# Patient Record
Sex: FEMALE | Race: WHITE | NOT HISPANIC OR LATINO | URBAN - METROPOLITAN AREA
[De-identification: names, ages, dates, MRNs, and addresses within clinical notes are randomized per-mention and may not be internally consistent; named-entity substitution may affect disease eponyms.]

---

## 2017-01-01 ENCOUNTER — HOME CARE/HOSPICE - HEALTHEAST (OUTPATIENT)
Dept: HOSPICE | Facility: HOSPICE | Age: 82
End: 2017-01-01

## 2017-01-01 ENCOUNTER — COMMUNICATION - HEALTHEAST (OUTPATIENT)
Dept: INTERNAL MEDICINE | Facility: CLINIC | Age: 82
End: 2017-01-01

## 2017-01-01 ENCOUNTER — AMBULATORY - HEALTHEAST (OUTPATIENT)
Dept: HOSPICE | Facility: HOSPICE | Age: 82
End: 2017-01-01

## 2017-01-01 ENCOUNTER — OFFICE VISIT - HEALTHEAST (OUTPATIENT)
Dept: GERIATRICS | Facility: CLINIC | Age: 82
End: 2017-01-01

## 2017-01-01 ENCOUNTER — OFFICE VISIT - HEALTHEAST (OUTPATIENT)
Dept: INTERNAL MEDICINE | Facility: CLINIC | Age: 82
End: 2017-01-01

## 2017-01-01 ENCOUNTER — AMBULATORY - HEALTHEAST (OUTPATIENT)
Dept: GERIATRICS | Facility: CLINIC | Age: 82
End: 2017-01-01

## 2017-01-01 ENCOUNTER — RECORDS - HEALTHEAST (OUTPATIENT)
Dept: GENERAL RADIOLOGY | Facility: CLINIC | Age: 82
End: 2017-01-01

## 2017-01-01 ENCOUNTER — AMBULATORY - HEALTHEAST (OUTPATIENT)
Dept: NURSING | Facility: CLINIC | Age: 82
End: 2017-01-01

## 2017-01-01 ENCOUNTER — COMMUNICATION - HEALTHEAST (OUTPATIENT)
Dept: GERIATRICS | Facility: CLINIC | Age: 82
End: 2017-01-01

## 2017-01-01 ENCOUNTER — HOME CARE/HOSPICE - HEALTHEAST (OUTPATIENT)
Dept: HOME HEALTH SERVICES | Facility: HOME HEALTH | Age: 82
End: 2017-01-01

## 2017-01-01 DIAGNOSIS — D64.89 ANEMIA DUE TO OTHER CAUSE: ICD-10-CM

## 2017-01-01 DIAGNOSIS — R06.00 DYSPNEA, UNSPECIFIED: ICD-10-CM

## 2017-01-01 DIAGNOSIS — R23.3 PETECHIAL RASH: ICD-10-CM

## 2017-01-01 DIAGNOSIS — I50.9 CONGESTIVE HEART FAILURE, UNSPECIFIED CONGESTIVE HEART FAILURE CHRONICITY, UNSPECIFIED CONGESTIVE HEART FAILURE TYPE: ICD-10-CM

## 2017-01-01 DIAGNOSIS — E03.9 HYPOTHYROIDISM: ICD-10-CM

## 2017-01-01 DIAGNOSIS — I48.20 CHRONIC ATRIAL FIBRILLATION (H): ICD-10-CM

## 2017-01-01 DIAGNOSIS — R06.00 DYSPNEA: ICD-10-CM

## 2017-01-01 DIAGNOSIS — M48.50XA VERTEBRAL COMPRESSION FRACTURE (H): ICD-10-CM

## 2017-01-01 DIAGNOSIS — M54.50 LOW BACK PAIN: ICD-10-CM

## 2017-01-01 DIAGNOSIS — I50.9 CHF (CONGESTIVE HEART FAILURE) (H): ICD-10-CM

## 2017-01-01 DIAGNOSIS — I10 ESSENTIAL HYPERTENSION: ICD-10-CM

## 2017-01-01 DIAGNOSIS — I10 ESSENTIAL HYPERTENSION WITH GOAL BLOOD PRESSURE LESS THAN 140/90: ICD-10-CM

## 2017-01-01 DIAGNOSIS — D72.828 OTHER ELEVATED WHITE BLOOD CELL (WBC) COUNT: ICD-10-CM

## 2017-01-01 DIAGNOSIS — E03.9 UNSPECIFIED HYPOTHYROIDISM: ICD-10-CM

## 2017-01-01 DIAGNOSIS — S32.000A LUMBAR COMPRESSION FRACTURE (H): ICD-10-CM

## 2017-01-01 DIAGNOSIS — M54.50 LUMBAGO: ICD-10-CM

## 2017-01-01 DIAGNOSIS — I10 UNSPECIFIED ESSENTIAL HYPERTENSION: ICD-10-CM

## 2017-01-01 RX ORDER — HALOPERIDOL 0.5 MG/1
0.5 TABLET ORAL EVERY 4 HOURS PRN
Status: SHIPPED | COMMUNITY
Start: 2017-01-01

## 2017-01-01 RX ORDER — TRAMADOL HYDROCHLORIDE 50 MG/1
25 TABLET ORAL 3 TIMES DAILY
Status: SHIPPED | COMMUNITY
Start: 2017-01-01

## 2017-01-01 RX ORDER — ACETAMINOPHEN 500 MG
500 TABLET ORAL EVERY 4 HOURS PRN
Status: SHIPPED | COMMUNITY
Start: 2017-01-01

## 2017-01-01 ASSESSMENT — MIFFLIN-ST. JEOR
SCORE: 727.88
SCORE: 723.34
SCORE: 714.27
SCORE: 764.71

## 2017-06-14 ENCOUNTER — COMMUNICATION - HEALTHEAST (OUTPATIENT)
Dept: GERIATRICS | Facility: CLINIC | Age: 82
End: 2017-06-14

## 2017-06-14 ENCOUNTER — AMBULATORY - HEALTHEAST (OUTPATIENT)
Dept: GERIATRICS | Facility: CLINIC | Age: 82
End: 2017-06-14

## 2017-06-14 ENCOUNTER — COMMUNICATION - HEALTHEAST (OUTPATIENT)
Dept: INTERNAL MEDICINE | Facility: CLINIC | Age: 82
End: 2017-06-14

## 2021-05-25 ENCOUNTER — RECORDS - HEALTHEAST (OUTPATIENT)
Dept: ADMINISTRATIVE | Facility: CLINIC | Age: 86
End: 2021-05-25

## 2021-05-26 ENCOUNTER — RECORDS - HEALTHEAST (OUTPATIENT)
Dept: ADMINISTRATIVE | Facility: CLINIC | Age: 86
End: 2021-05-26

## 2021-05-27 ENCOUNTER — RECORDS - HEALTHEAST (OUTPATIENT)
Dept: ADMINISTRATIVE | Facility: CLINIC | Age: 86
End: 2021-05-27

## 2021-05-28 ENCOUNTER — RECORDS - HEALTHEAST (OUTPATIENT)
Dept: ADMINISTRATIVE | Facility: CLINIC | Age: 86
End: 2021-05-28

## 2021-05-29 ENCOUNTER — RECORDS - HEALTHEAST (OUTPATIENT)
Dept: ADMINISTRATIVE | Facility: CLINIC | Age: 86
End: 2021-05-29

## 2021-05-30 ENCOUNTER — RECORDS - HEALTHEAST (OUTPATIENT)
Dept: ADMINISTRATIVE | Facility: CLINIC | Age: 86
End: 2021-05-30

## 2021-05-30 VITALS — HEIGHT: 62 IN | WEIGHT: 84 LBS | BODY MASS INDEX: 15.46 KG/M2

## 2021-05-30 VITALS — BODY MASS INDEX: 16.01 KG/M2 | HEIGHT: 62 IN | WEIGHT: 87 LBS

## 2021-05-30 VITALS — BODY MASS INDEX: 14.14 KG/M2 | WEIGHT: 79.8 LBS

## 2021-05-30 VITALS — WEIGHT: 74 LBS | BODY MASS INDEX: 13.11 KG/M2

## 2021-05-30 VITALS — BODY MASS INDEX: 17.51 KG/M2 | HEIGHT: 62 IN | WEIGHT: 95.12 LBS

## 2021-05-30 VITALS — BODY MASS INDEX: 15.83 KG/M2 | HEIGHT: 62 IN | WEIGHT: 86 LBS

## 2021-05-31 ENCOUNTER — RECORDS - HEALTHEAST (OUTPATIENT)
Dept: ADMINISTRATIVE | Facility: CLINIC | Age: 86
End: 2021-05-31

## 2021-06-02 ENCOUNTER — RECORDS - HEALTHEAST (OUTPATIENT)
Dept: ADMINISTRATIVE | Facility: CLINIC | Age: 86
End: 2021-06-02

## 2021-06-08 NOTE — PROGRESS NOTES
Shyla De La Rosa presents for her B12 injection. immunization given without incident. See MAR  history for documentation details.     Socorro Martínez CSS

## 2021-06-09 NOTE — PROGRESS NOTES
"  Office Visit - Follow Up   Shyla De La Rosa   89 y.o. female    Date of Visit: 3/10/2017    Chief Complaint   Patient presents with     Rash        Assessment and Plan   1. Petechial rash  I suspect this is bruising from where she crosses her legs.  Will check her blood counts make sure her platelets and INR are okay.  She has had a mild leukocytosis and macrocytic anemia, possibly myelodysplastic syndrome.  She should follow-up with Dr. Sukhjinder Miller next week  - HM1(CBC and Differential)  - INR  - HM1 (CBC with Diff)    2. Anemia due to other cause  3. Other elevated white blood cell (WBC) count    Return in about 1 week (around 3/17/2017) for follow up with PCP.     History of Present Illness   This 89 y.o. old woman comes in with her niece for evaluation of a rash on her upper legs.  She notes this past couple of days.  It is red.  Not bothersome at all no pain no itching.  Otherwise feeling okay.  Fever chills no nausea vomiting diarrhea.  No chest pain no night sweats no weight changes.    Review of Systems: A comprehensive review of systems was negative except as noted.     Medications, Allergies and Problem List   Reviewed and updated     Physical Exam   General Appearance:   No acute distress    Visit Vitals     /66 (Patient Site: Left Arm, Patient Position: Sitting, Cuff Size: Adult Regular)     Pulse 75     Ht 5' 2\" (1.575 m)     Wt (!) 84 lb (38.1 kg)     SpO2 97%     BMI 15.36 kg/m2       Cardiovascular regular rate and rhythm no murmur gallop or rub lungs clear to auscultation bilaterally abdomen is thin, nontender nondistended no organomegaly she has no lymphadenopathy she has a petechial, nonpalpable bruising in her legs, inner thighs upper      Additional Information   Current Outpatient Prescriptions   Medication Sig Dispense Refill     acetaminophen (TYLENOL) 500 MG tablet Take 500-1,000 mg by mouth every 6 (six) hours as needed for pain.       amLODIPine (NORVASC) 2.5 MG tablet TAKE " ONE TABLET BY MOUTH ONE TIME DAILY WITH LUNCH 90 tablet 3     calcium-vitamin D (CALCIUM-VITAMIN D) 500 mg(1,250mg) -200 unit per tablet Take 1 tablet by mouth 2 (two) times a day with meals.       cholecalciferol, vitamin D3, (VITAMIN D3) 1,000 unit capsule Take 1,000 Units by mouth daily.       cyanocobalamin 1,000 mcg/mL injection Inject 1,000 mcg into the shoulder, thigh, or buttocks every 30 (thirty) days.       digoxin (LANOXIN) 250 mcg tablet TAKE ONE TABLET BY MOUTH NIGHTLY AT BEDTIME 90 tablet 2     ferrous gluconate (FERGON) 240 mg (27 mg iron) tablet Take 240 mg by mouth daily with lunch.        gabapentin (NEURONTIN) 100 MG capsule TAKE 1 CAPSULE BY MOUTH ONCE DAILY 30 capsule 2     levothyroxine (SYNTHROID, LEVOTHROID) 50 MCG tablet TAKE ONE TABLET BY MOUTH ONE TIME DAILY AT 6AM 90 tablet 3     WALKER MISC Use As Directed.       [DISCONTINUED] omeprazole (PRILOSEC) 20 MG capsule Take 20 mg by mouth daily.       Current Facility-Administered Medications   Medication Dose Route Frequency Provider Last Rate Last Dose     cyanocobalamin injection 1,000 mcg  1,000 mcg Intramuscular Q30 Days Sukjhinder Meléndez MD   1,000 mcg at 02/09/17 0947     No Known Allergies  Social History   Substance Use Topics     Smoking status: Former Smoker     Years: 52.00     Types: Cigarettes     Quit date: 1/1/1994     Smokeless tobacco: Never Used     Alcohol use No      Comment: Occasional glass of wine       Review and/or order of clinical lab tests:  Review and/or order of radiology tests:  Review and/or order of medicine tests:  Discussion of test results with performing physician:  Decision to obtain old records and/or obtain history from someone other than the patient:  Review and summarization of old records and/or obtaining history from someone other than the patient and.or discussion of case with another health care provider:  Independent visualization of image, tracing or specimen itself:    Time:      Levi  Artis Mathews MD

## 2021-06-09 NOTE — PROGRESS NOTES
"OFFICE VISIT NOTE    Subjective:   Chief Complaint:  Cough (X 5 days, nonproductive, hard to cough, SOB at night. gained 11 pds since 3/10/17)    89-year-old woman with multiple problems including hypertension and atrial fibrillation.  She is in with for 5 days of increasing cough and some mild dyspnea and orthopnea.  Some weight gain.  No fevers or chills.  No nausea or vomiting.  No dysuria.    Current Outpatient Prescriptions   Medication Sig     acetaminophen (TYLENOL) 500 MG tablet Take 500-1,000 mg by mouth every 6 (six) hours as needed for pain.     amLODIPine (NORVASC) 2.5 MG tablet TAKE ONE TABLET BY MOUTH ONE TIME DAILY WITH LUNCH     calcium-vitamin D (CALCIUM-VITAMIN D) 500 mg(1,250mg) -200 unit per tablet Take 1 tablet by mouth 2 (two) times a day with meals.     cholecalciferol, vitamin D3, (VITAMIN D3) 1,000 unit capsule Take 1,000 Units by mouth daily.     cyanocobalamin 1,000 mcg/mL injection Inject 1,000 mcg into the shoulder, thigh, or buttocks every 30 (thirty) days.     digoxin (LANOXIN) 250 mcg tablet TAKE ONE TABLET BY MOUTH NIGHTLY AT BEDTIME     ferrous gluconate (FERGON) 240 mg (27 mg iron) tablet Take 240 mg by mouth daily with lunch.      gabapentin (NEURONTIN) 100 MG capsule TAKE 1 CAPSULE BY MOUTH ONCE DAILY     levothyroxine (SYNTHROID, LEVOTHROID) 50 MCG tablet TAKE ONE TABLET BY MOUTH ONE TIME DAILY AT 6AM     WALKER MISC Use As Directed.     furosemide (LASIX) 20 MG tablet Take 1 tablet (20 mg total) by mouth daily.     [DISCONTINUED] omeprazole (PRILOSEC) 20 MG capsule Take 20 mg by mouth daily.       PSFHx: Tobacco Status:  She  reports that she quit smoking about 23 years ago. Her smoking use included Cigarettes. She quit after 52.00 years of use. She has never used smokeless tobacco.    Review of Systems:  A comprehensive review of systems is negative except for the comments above    Objective:    Pulse 61  Temp 98.5  F (36.9  C) (Oral)   Ht 5' 2\" (1.575 m)  Wt (!) 95 lb 1.9 " oz (43.1 kg)  SpO2 95%  BMI 17.4 kg/m2  GENERAL: No acute distress.  She is afebrile.  Oxygen saturations 96%.  Pulse 68.  2+ edema of the left leg 1+ edema in the right leg  No JVD.  Lungs have a few chronic rales at the lung bases  Heart shows atrial fibrillation with controlled ventricular rate.  No cyanosis.  The abdomen is without any ascites.  ENT examination is normal without any signs of infection.  Mentally she is sharp and alert.    Assessment & Plan   Shyla De La Rosa is a 89 y.o. female.    Increasing cough and dyspnea.  Rule out mild congestive heart failure.  Will check a chest x-ray.  Check a BNP level and a basic metabolic profile as well as a hemoglobin.  Start Lasix 20 mg daily in the morning for the next week and see if she improves.  Call if there is any fever or deterioration.    Diagnoses and all orders for this visit:    Chronic atrial fibrillation    Dyspnea  -     XR Chest PA and Lateral; Future; Expected date: 3/28/17  -     Hemoglobin  -     BNP(B-type Natriuretic Peptide)  -     furosemide (LASIX) 20 MG tablet; Take 1 tablet (20 mg total) by mouth daily.  Dispense: 15 tablet; Refill: 0    Essential hypertension with goal blood pressure less than 140/90  -     Basic Metabolic Panel            Diomedes Diaz MD  Transcription using voice recognition software, may contain typographical errors.

## 2021-06-10 NOTE — PROGRESS NOTES
Office Visit - Follow up    Shyla De La Rosa   89 y.o. female    Date of Visit: 4/18/2017    Chief Complaint   Patient presents with     Back Pain     low- started last night       Subjective: Low back pain lumbago.  Since last night takes breath away.  Pain is severe.  Will try tramadol already on gabapentin.  X-ray lumbar spine and pelvis pending.  No antecedent trauma or injury no associated rash of shingles.    No blood in stool or urine no chest pain or shortness of breath wheelchair-bound now.  Accompanied by a niece the patient has no children she is a .  Medication list reviewed generally well-tolerated.    ROS: A comprehensive review of systems was performed and was otherwise negative    Medications:  Prior to Admission medications    Medication Sig Start Date End Date Taking? Authorizing Provider   acetaminophen (TYLENOL) 500 MG tablet Take 500-1,000 mg by mouth every 6 (six) hours as needed for pain.   Yes PROVIDER, HISTORICAL   amLODIPine (NORVASC) 2.5 MG tablet TAKE ONE TABLET BY MOUTH ONE TIME DAILY WITH LUNCH 2/19/17  Yes Sukhjinder Meléndez MD   calcium-vitamin D (CALCIUM-VITAMIN D) 500 mg(1,250mg) -200 unit per tablet Take 1 tablet by mouth 2 (two) times a day with meals.   Yes Sukhjinder Meléndez MD   cholecalciferol, vitamin D3, (VITAMIN D3) 1,000 unit capsule Take 1,000 Units by mouth daily.   Yes PROVIDER, HISTORICAL   ferrous gluconate (FERGON) 240 mg (27 mg iron) tablet Take 240 mg by mouth daily with lunch.    Yes PROVIDER, HISTORICAL   furosemide (LASIX) 20 MG tablet TAKE 1 TABLET BY MOUTH DAILY. 4/7/17  Yes Sukhjinder Meléndez MD   gabapentin (NEURONTIN) 100 MG capsule TAKE 1 CAPSULE BY MOUTH ONCE DAILY 3/30/17  Yes Sukhjinder Meléndez MD   levothyroxine (SYNTHROID, LEVOTHROID) 50 MCG tablet TAKE ONE TABLET BY MOUTH ONE TIME DAILY AT 6AM 2/19/17  Yes Sukhjinder Meléndez MD   cyanocobalamin 1,000 mcg/mL injection Inject 1,000 mcg into the shoulder, thigh, or buttocks every 30 (thirty)  days.    PROVIDER, HISTORICAL   digoxin (LANOXIN) 250 mcg tablet TAKE ONE TABLET BY MOUTH NIGHTLY AT BEDTIME 2/17/17   Sukhjinder Meléndez MD   traMADol (ULTRAM) 50 mg tablet Take 1 tablet (50 mg total) by mouth every 6 (six) hours as needed for pain. 4/18/17 4/28/17  Sukhjinder Meléndez MD   WALKER MISC Use As Directed.    Sukhjinder Meléndez MD   omeprazole (PRILOSEC) 20 MG capsule Take 20 mg by mouth daily.  12/30/14  Jeanette Mckeon MD       Allergies: No Known Allergies    Immunizations:   Immunization History   Administered Date(s) Administered     DT (pediatric) 09/05/2006     Influenza high dose, seasonal 10/23/2015, 10/04/2016     Influenza, inj, historic 10/15/2007, 10/06/2008, 10/05/2009, 10/19/2010, 10/10/2011     Influenza, seasonal,quad inj 6-35 mos 10/11/2012, 10/04/2013     Influenza,seasonal quad, PF, 36+MOS 10/22/2014     Pneumo Polysac 23-V 10/29/2004     Td, historic 09/05/2006       Exam Chest clear to auscultation and percussion.  Heart tones regular rhythm without murmur rub or gallop.  Abdomen soft nontender no organomegaly.  No peritoneal signs.  Extremities free of edema cyanosis or clubbing.  Neck veins nondistended no thyromegaly or scleral icterus noted, carotids full.  Skin warm and dry easily conversant good spirited.  Normal intelligence.  Neurologically intact no gross localizing findings.  Uncomfortable sitting in the chair she is underweight BMI 15.73.  No associated rash not necessarily tender to the touch    Assessment and Plan  Low back pain lumbago check x-ray of lumbar spine and pelvis.  Continue gabapentin same.  Start tramadol 50 mg every 6 hours as needed for pain.    Underweight.    Pernicious anemia continue vitamin B12 injection 1000  g IM monthly.    Hypertension and hypothyroidism controlled and on replacement therapy clinically euthyroid.    Time: total time spent with the patient was 25 minutes of which >50% was spent in counseling and coordination of  care        Sukhjinder Meléndez MD    Patient Active Problem List   Diagnosis     Cataract     Premature Ventricular Contractions     Left Ventricular Hypertrophy     Skin Cancer     Hypothyroidism     Adenocarcinoma Of The Uterus     Pernicious anemia     Essential Hypertension     GI bleeding     Vertebral compression fracture     DVT (deep venous thrombosis)     Atrial fibrillation, rate controlled     B12 deficiency     Phlebitis     Skin tear     Abdominal pain     Neuritis     Postpartum deep vein thrombosis     Urinary frequency     Urinary tract infection     Fatigue     Generalized weakness     Anemia- on fe and b12     Underweight     General weakness     Near syncope     Syncope     Cachexia     Impaired hearing

## 2021-06-10 NOTE — PROGRESS NOTES
[facility]      Facility:    [facility] [[id number]]  Code Status: FULL CODE       Chief Complaint/Reason for Visit:  Chief Complaint   Patient presents with     H & P     New admit to TCU for compression fractures. (H & P 4/27/17).       HPI:   Shyla is a 89 y.o. female with hx HTN, afib, malnutrition, presented to the hospital on 4/20/17 due to back pain. This was so severe at home she could not get out of bed. She has had weight loss over time, now down to about 90 pounds. She was hypoxic on admission to near 80% on room air. She had CT of thoracic and lumbar spine which showed acute appearing inferior end plate compression fracture at L1 with less than 30 % vertebral height loss. Chronic compression deformities at T 12 and L 3 s/p prior vertebroplasties at those levels. She was seen by neurosurgery with recommendation to wear LSO brace. Pain managed with prn Tramadol. [place] to need TCU, possible [facility] or LTC in the future rather than return to live with her niece. Overall stabilized and discharged to TCU on 4/22/17 for PT, OT, nursing cares, medical management and monitoring.     She has a corset type LSO brace to wear when up. Has significant pain, some concerns of confusion with tramadol 50 mg so will change the order to try 25 mg dose. Will also scheduled tylenol as she has not been asking for it. She has no cough, cold or congestion. Denies shortness of breath or chest pain. No dizziness. Appetite is poor, dietician has spoken with her. She denies nausea, vomiting, diarrhea or constipation. No abdominal pain. No new vision or hearing problems.      Past Medical History:  Past Medical History:   Diagnosis Date     Advanced care planning/counseling discussion     DNR 8/5/15     Atrial fibrillation      B12 deficiency      DVT (deep venous thrombosis)      GERD (gastroesophageal reflux disease)      History of GI bleed      History of shingles      Hypertension       Hypothyroidism      Neuropathy      Osteoporosis screening      Right carpal tunnel syndrome      Uterine cancer      Vertebral compression fracture            Surgical History:  Past Surgical History:   Procedure Laterality Date     ESOPHAGOGASTRODUODENOSCOPY N/A 10/22/2014    Procedure: ESOPHAGOGASTRODUODENOSCOPY;  Surgeon: Angel Luis Frost MD;  Location: [facility];  Service:      EYE SURGERY Bilateral     cataracts     HYSTERECTOMY       radiation therapy      for uterine cancer     SKIN BIOPSY       TONSILLECTOMY       VERTEBROPLASTY         Family History:   Family History   Problem Relation Age of Onset     No Medical Problems Mother      Cholelithiasis Father      Anesthesia problems Neg Hx      Colon cancer Neg Hx      Colon polyps Neg Hx        Social History:    Social History     Social History     Marital status:      Spouse name: N/A     Number of children: 0     Years of education: N/A     Occupational History           Social History Main Topics     Smoking status: Former Smoker     Years: 52.00     Types: Cigarettes     Quit date: 1/1/1994     Smokeless tobacco: Never Used     Alcohol use No      Comment: Occasional glass of wine     Drug use: No     Sexual activity: Not on file     Other Topics Concern     Not on file     Social History Narrative    Pt  and never had children. She lives at home and gets help from niece Clarissa 1-2x week. Has had [facility] in the past.        Medications:  Current Outpatient Prescriptions   Medication Sig Note     acetaminophen (TYLENOL) 500 MG tablet Take 500-1,000 mg by mouth every 6 (six) hours as needed for pain.      amLODIPine (NORVASC) 2.5 MG tablet Take 2.5 mg by mouth daily with lunch.      calcium-vitamin D (CALCIUM-VITAMIN D) 500 mg(1,250mg) -200 unit per tablet Take 1 tablet by mouth 2 (two) times a day with meals. 4/20/2017: Family was giving patient only once daily but MD instruction to take BID     cholecalciferol, vitamin  D3, (VITAMIN D3) 1,000 unit capsule Take 1,000 Units by mouth daily.      cyanocobalamin 1,000 mcg/mL injection Inject 1,000 mcg into the shoulder, thigh, or buttocks every 30 (thirty) days. 4/20/2017: .      digoxin (LANOXIN) 250 mcg tablet Take 250 mcg by mouth daily.      ferrous gluconate (FERGON) 240 mg (27 mg iron) tablet Take 240 mg by mouth daily with lunch.       furosemide (LASIX) 20 MG tablet Take 20 mg by mouth daily.      furosemide (LASIX) 20 MG tablet TAKE 1 TABLET BY MOUTH DAILY.      gabapentin (NEURONTIN) 100 MG capsule Take 100 mg by mouth daily.      levothyroxine (SYNTHROID, LEVOTHROID) 50 MCG tablet Take 50 mcg by mouth daily.      senna-docusate (PERICOLACE) 8.6-50 mg tablet Take 1 tablet by mouth 2 (two) times a day.      traMADol (ULTRAM) 50 mg tablet Take 1 tablet (50 mg total) by mouth every 6 (six) hours as needed for pain.      WALKER MISC Use As Directed.      [DISCONTINUED] omeprazole (PRILOSEC) 20 MG capsule Take 20 mg by mouth daily.        Allergies:  No Known Allergies      Review of Systems:  Pertinent items are noted in HPI.      Physical Exam:   General: Patient is alert, pleasant elderly female, no distress. Very thin and frail.  Vitals: /58, Temp 97.3, Pulse 64, RR 20, O2 sat 97% RA.  HEENT: Head is NCAT. Eyes show no injection or icterus. Nares negative. Oropharynx well hydrated.  Neck: Supple. No tenderness or adenopathy. No JVD.  Lungs: Clear bilaterally. No wheezes.  Cardiovascular: Regular rate and rhythm, loud systolic murmur.  Back: No spinal or CVA tenderness.  Abdomen: Soft, no tenderness on exam. Bowel sounds present. No guarding rebound or rigidity.  : Deferred.  Extremities:Mild swelling noted at ankles (baseline).   Musculoskeletal: Age related degen changes.   Skin: Few bruises.   Psych: Mood appears good.      Labs:     4/20/17 (Hosp admit):  Results for orders placed or performed during the hospital encounter of 04/20/17   Basic Metabolic Panel   Result  Value Ref Range    Sodium 137 136 - 145 mmol/L    Potassium 3.8 3.5 - 5.0 mmol/L    Chloride 96 (L) 98 - 107 mmol/L    CO2 34 (H) 22 - 31 mmol/L    Anion Gap, Calculation 7 5 - 18 mmol/L    Glucose 124 70 - 125 mg/dL    Calcium 9.1 8.5 - 10.5 mg/dL    BUN 25 8 - 28 mg/dL    Creatinine 0.98 0.60 - 1.10 mg/dL    GFR MDRD Af Amer >60 >60 mL/min/1.73m2    GFR MDRD Non Af Amer 53 (L) >60 mL/min/1.73m2     Lab Results   Component Value Date    WBC 9.3 04/20/2017    HGB 9.2 (L) 04/20/2017    HCT 27.7 (L) 04/20/2017     (H) 04/20/2017     04/20/2017       Assessment/Plan:  1. Compression fracture of  L1 (acute). Has LSO to wear when OOB. Has chronic L3 and T12 fxs s/p prior vertebroplasties.  2. HTN. On Amlodipine and Lasix.   3. Anemia. On Fe and B12.  4. Afib. Cont digoxin for rate control.  5. Hypothyroidism. Cont home replacement.  6. Osteoporosis. She is on calcium and vitamin D supplementation.  7. Code status is full code.      Total time greater than 50 minutes, greater than 50% counseling and coordination of care, time spent in interview and examination of patient, review of records, discussion with nursing staff.      Electronically signed by: Jenny Nguyen MD

## 2021-06-10 NOTE — PROGRESS NOTES
[facility]      Facility:    [facility] [[id number]]  Code Status: FULL CODE       Chief Complaint/Reason for Visit:  Chief Complaint   Patient presents with     H & P     New admit to LTC, transfer from TCU. (H & P 5/15/17).       HPI:   Shyla is a 89 y.o. female with hx HTN, afib, malnutrition who is admitted to LTC at [facility] after a TCU stay. She was hospitalized 4/20/17-4/22/17, presenting with back pain so severe at home she could not get out of bed. She was hypoxic on admission to near 80% on room air. She had CT of thoracic and lumbar spine which showed acute appearing inferior end plate compression fracture at L1 with less than 30 % vertebral height loss. Chronic compression deformities at T 12 and L 3 s/p prior vertebroplasties at those levels. She was seen by neurosurgery with recommendation to wear LSO brace. Pain managed with prn Tramadol. [place] to need TCU, possible [facility] or LTC in the future rather than return to live with her niece. Overall stabilized and discharged to TCU on 4/22/17 for PT, OT, nursing cares, medical management and monitoring.     She had no complications during her TCU stay. Has LSO brace to wear for support. Some concerns of confusion with tramadol 50 mg so the order is 25 or 50 mg per dose. She is on scheduled tylenol. Family care conference held with decision for LTC placement. She has been adjusting well. She denies headache, nausea, vomiting, chest pain or shortness of breath. Appetite is poor, she states she eats what she wants. She is on some supplements. Had been loosing weight at home over time. She has no cough, cold or congestion. No dizziness. No abdominal pain. No new vision or hearing problems.      Past Medical History:  Past Medical History:   Diagnosis Date     Advanced care planning/counseling discussion     DNR 8/5/15     Atrial fibrillation      B12 deficiency      DVT (deep venous thrombosis)       GERD (gastroesophageal reflux disease)      History of GI bleed      History of shingles      Hypertension      Hypothyroidism      Neuropathy      Osteoporosis screening      Right carpal tunnel syndrome      Uterine cancer      Vertebral compression fracture            Surgical History:  Past Surgical History:   Procedure Laterality Date     ESOPHAGOGASTRODUODENOSCOPY N/A 10/22/2014    Procedure: ESOPHAGOGASTRODUODENOSCOPY;  Surgeon: Angel Luis Frost MD;  Location: [facility];  Service:      EYE SURGERY Bilateral     cataracts     HYSTERECTOMY       radiation therapy      for uterine cancer     SKIN BIOPSY       TONSILLECTOMY       VERTEBROPLASTY         Family History:   Family History   Problem Relation Age of Onset     No Medical Problems Mother      Cholelithiasis Father      Anesthesia problems Neg Hx      Colon cancer Neg Hx      Colon polyps Neg Hx        Social History:    Social History     Social History     Marital status:      Spouse name: N/A     Number of children: 0     Years of education: N/A     Occupational History           Social History Main Topics     Smoking status: Former Smoker     Years: 52.00     Types: Cigarettes     Quit date: 1/1/1994     Smokeless tobacco: Never Used     Alcohol use No      Comment: Occasional glass of wine     Drug use: No     Sexual activity: Not on file     Other Topics Concern     Not on file     Social History Narrative    Pt  and never had children. She lives at home and gets help from niece Clarissa 1-2x week. Has had [facility] in the past.        Medications:  Current Outpatient Prescriptions   Medication Sig Note     acetaminophen (TYLENOL) 500 MG tablet Take 1,000 mg by mouth 3 (three) times a day.       amLODIPine (NORVASC) 2.5 MG tablet Take 2.5 mg by mouth daily with lunch.      calcium-vitamin D (CALCIUM-VITAMIN D) 500 mg(1,250mg) -200 unit per tablet Take 1 tablet by mouth 2 (two) times a day with meals. 4/20/2017: Family was  giving patient only once daily but MD instruction to take BID     cholecalciferol, vitamin D3, (VITAMIN D3) 1,000 unit capsule Take 1,000 Units by mouth daily.      cyanocobalamin 1,000 mcg/mL injection Inject 1,000 mcg into the shoulder, thigh, or buttocks every 30 (thirty) days. 4/20/2017: .      digoxin (LANOXIN) 250 mcg tablet Take 250 mcg by mouth daily.      ferrous gluconate (FERGON) 240 mg (27 mg iron) tablet Take 240 mg by mouth daily with lunch.       furosemide (LASIX) 20 MG tablet Take 20 mg by mouth daily.      furosemide (LASIX) 20 MG tablet TAKE 1 TABLET BY MOUTH DAILY.      gabapentin (NEURONTIN) 100 MG capsule Take 100 mg by mouth daily.      levothyroxine (SYNTHROID, LEVOTHROID) 50 MCG tablet Take 50 mcg by mouth daily.      senna-docusate (PERICOLACE) 8.6-50 mg tablet Take 1 tablet by mouth 2 (two) times a day.      WALKER MISC Use As Directed.      [DISCONTINUED] omeprazole (PRILOSEC) 20 MG capsule Take 20 mg by mouth daily.        Allergies:  No Known Allergies      Review of Systems:  Pertinent items are noted in HPI.      Physical Exam:   General: Patient is alert, pleasant elderly female, no distress. Very thin and frail.  Vitals: /43, Temp 98.1, Pulse 80, RR 18, O2 sat 96% RA.  HEENT: Head is NCAT. Eyes show no injection or icterus. Nares negative. Oropharynx well hydrated.  Neck: Supple. No tenderness or adenopathy. No JVD.  Lungs: Clear bilaterally. No wheezes.  Cardiovascular: Regular rate and rhythm, loud systolic murmur.  Back: Wearing LSO brace.  Abdomen: Soft, no tenderness on exam. Bowel sounds present. No guarding rebound or rigidity.  : Deferred.  Extremities: Mild swelling noted at ankles (baseline).   Musculoskeletal: Age related degen changes.   Skin: No rashes.  Psych: Mood appears good.      Labs:  4/20/17 (Hosp admit):  Results for orders placed or performed during the hospital encounter of 04/20/17   Basic Metabolic Panel   Result Value Ref Range    Sodium 137 136 -  145 mmol/L    Potassium 3.8 3.5 - 5.0 mmol/L    Chloride 96 (L) 98 - 107 mmol/L    CO2 34 (H) 22 - 31 mmol/L    Anion Gap, Calculation 7 5 - 18 mmol/L    Glucose 124 70 - 125 mg/dL    Calcium 9.1 8.5 - 10.5 mg/dL    BUN 25 8 - 28 mg/dL    Creatinine 0.98 0.60 - 1.10 mg/dL    GFR MDRD Af Amer >60 >60 mL/min/1.73m2    GFR MDRD Non Af Amer 53 (L) >60 mL/min/1.73m2     Lab Results   Component Value Date    WBC 9.3 04/20/2017    HGB 9.2 (L) 04/20/2017    HCT 27.7 (L) 04/20/2017     (H) 04/20/2017     04/20/2017       Vitamin D, Total (25-Hydroxy)   Date Value Ref Range Status   04/21/2017 39.9 30.0 - 80.0 ng/mL Final       Assessment/Plan:  1. Compression fracture of L1 (new). Hx of prior compression fractures as well. Has LSO brace. Pain management primarily with tylenol though does have prn Tramadol order.  2. HTN. On Amlodipine. BPs acceptable.  3. Afib. Good rate control, not on anticoagulation. On digoxin, check dig level with next routine labs.  4. Anemia. On iron and B12.  5. Hypothyroidism. Cont replacement levothyroxine. Update TSH with next labs, may have been done with prior community clinic. No results in Epic since 2015.  6. Osteoporosis. Cont calcium and vitamin D supplementation.  7. Code status is full code.      Total time greater than 35 minutes, greater than 50% counseling and coordination of care, time spent in interview and examination of patient, review of records, discussion with nursing staff.      Electronically signed by: Jenny Nguyen MD

## 2021-06-15 PROBLEM — R09.02 HYPOXEMIA: Status: ACTIVE | Noted: 2017-01-01

## 2021-06-15 PROBLEM — M80.08XA VERTEBRAL FRACTURE, OSTEOPOROTIC (H): Status: ACTIVE | Noted: 2017-01-01

## 2021-06-15 PROBLEM — M48.56XA COMPRESSION FRACTURE OF LUMBAR SPINE, NON-TRAUMATIC (H): Status: ACTIVE | Noted: 2017-01-01

## 2021-06-15 PROBLEM — R63.4 ABNORMAL WEIGHT LOSS: Status: ACTIVE | Noted: 2017-01-01

## 2021-06-16 PROBLEM — T46.0X1A DIGOXIN TOXICITY: Status: ACTIVE | Noted: 2017-01-01

## 2021-06-16 PROBLEM — I50.9 CHF (CONGESTIVE HEART FAILURE) (H): Status: ACTIVE | Noted: 2017-01-01
